# Patient Record
Sex: FEMALE | Race: WHITE
[De-identification: names, ages, dates, MRNs, and addresses within clinical notes are randomized per-mention and may not be internally consistent; named-entity substitution may affect disease eponyms.]

---

## 2021-02-20 ENCOUNTER — HOSPITAL ENCOUNTER (EMERGENCY)
Age: 46
Discharge: HOME | End: 2021-02-20
Payer: SELF-PAY

## 2021-02-20 VITALS
HEART RATE: 72 BPM | RESPIRATION RATE: 16 BRPM | TEMPERATURE: 97.88 F | SYSTOLIC BLOOD PRESSURE: 147 MMHG | DIASTOLIC BLOOD PRESSURE: 86 MMHG | OXYGEN SATURATION: 97 %

## 2021-02-20 VITALS — BODY MASS INDEX: 32.5 KG/M2
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
## 2021-02-20 DIAGNOSIS — M77.8: Primary | ICD-10-CM

## 2021-02-20 PROCEDURE — 99281 EMR DPT VST MAYX REQ PHY/QHP: CPT

## 2021-02-20 PROCEDURE — 73080 X-RAY EXAM OF ELBOW: CPT

## 2021-02-20 PROCEDURE — 99283 EMERGENCY DEPT VISIT LOW MDM: CPT

## 2021-02-20 NOTE — ED_ITS
"HPI - Extremity Injury (Upper)    
General    
Chief Complaint: Extremity Injury, Upper    
Stated Complaint: elbow pain    
Time Seen by Provider: 02/20/21 13:21    
Source: patient    
Mode of arrival: Ambulatory    
Limitations: no limitations    
History of Present Illness    
HPI narrative: Patient is a 45-year-old female who presents with right elbow   
pain.  She says she was at work a few weeks ago lifting boxes when she felt   
something pop.  Initially she did not have pain but since then she has had   
numbness and tingling along her median nerve in thumb index middle finger and   
part ring finger as well.  It hurts to flex extend pronate and supinate.  It has  
been off and on erythematous currently not erythematous she has not had any   
fever.  She has been taking Tylenol only she is unable to take NSAIDs because   
she is allergic    
MD complaint: injury to: right and elbow    
Relieving factors: none    
Related Data    
                                  Previous Rx's    
    
    
    
 Medication  Instructions  Recorded    
     
prednisone 20 mg PO DAILY #5 tab 02/20/21    
    
    
    
    
Review of Systems    
Review of Systems    
Narrative: GENERAL: Denies chills,fever    
HEENT: Denies throat pain    
RESPIRATORY: Denies dyspnea, cough, wheezing    
CARDIOVASCULAR: Denies chest pain, palpitations    
GASTROINTESTINAL: Denies nausea, vomiting    
MUSCULOSKELETAL:  See HPI    
SKIN: No rash, no laceration, no pruritus    
NEUROLOGIC: Denies weakness, dizziness, headache, numbness    
    
    
8 point review of systems is negative except for those stated above and HPI    
    
Patient History    
Medical History (Reviewed 02/20/21 @ 14:08 by Parul Davila DO)    
    
Fibromyalgia    
    
    
Social History (Reviewed 02/20/21 @ 14:08 by Parul Davila DO)    
Smoking Status:  Former smoker     
    
    
Smoking Status: Former smoker    
alcohol intake frequency: holidays/special occasions only    
Substance Use Type: does not use    
    
Exam    
Initial Vital Signs    
Initial Vital Signs: Vital Signs    
    
    
    
Temperature  97.9 F   02/20/21 13:07    
     
Pulse Rate  72   02/20/21 13:07    
     
Respiratory Rate  16   02/20/21 13:07    
     
Blood Pressure  147/86 H  02/20/21 13:07    
     
Pulse Oximetry  97   02/20/21 13:07    
    
    
GENERAL: Well-appearing, well-nourished and in no acute distress.    
CARDIOVASCULAR: peripheral pulses in tact, cap refill <2 sec    
RESPIRATORY: No respiratory distress,  speaks in full sentences without   
difficulty    
EXTREMITIES: Normal range of motion, no clubbing or edema.  Neurovascularly   
intact    
Right elbow:  Able to flex and extend but very slowly painful pronation   
supination.  Has significant pain medial side no bursitis no erythema    
NEUROLOGICAL: Cranial nerves II through XII grossly intact.  Normal gait and   
speech.    
SKIN: Warm, dry, no petechiae, no rashes or lesions.    
    
Course    
Orders    
Ordered:                            ED Orders    
    
02/20/21 13:13    
XR elbow RT min 3V Stat     
    
    
    
Vital Signs    
Vital signs:                   Vital Signs - 8 hr    
    
    
    
 02/20/21    
13:07    
     
Temperature 97.9 F    
     
Pulse Rate 72    
     
Respiratory Rate 16    
     
Blood Pressure 147/86 H    
     
Pulse Oximetry 97    
    
    
    
    
MDM - Extremity Injury (Upper)    
Imaging Data    
Extremity x-ray #1:     
      Radiologist's Impression:     
PROCEDURE:  XR ELBOW RT MIN 3V    
     
INDICATIONS:  elbow pain    
     
TECHNIQUE:  3 views of the elbow were acquired.     
     
COMPARISON:  None.    
     
FINDINGS:     
     
Bones:  No fractures or dislocations.  No suspicious bony lesions.     
     
Soft tissues:  No elbow joint effusion.  No suspicious soft tissue   
calcifications.     
     
     
IMPRESSION:  Elbow p
[id number]|2021-02-20 16:13:33|2021-02-20 16:13:33|ED.[email or web address]||||"HPI - Extremity Injury (Upper)

## 2021-02-20 NOTE — ED.UPPEXIN
"HPI - Extremity Injury (Upper)
General
Chief Complaint: Extremity Injury, Upper
Stated Complaint: elbow pain
Time Seen by Provider: 02/20/21 13:21
Source: patient
Mode of arrival: Ambulatory
Limitations: no limitations
History of Present Illness
HPI narrative: Patient is a 45-year-old female who presents with right elbow pain.  She says she was at work a few weeks ago lifting boxes when she felt something pop.  Initially she did not have pain but since then she has had numbness and tingling 
along her median nerve in thumb index middle finger and part ring finger as well.  It hurts to flex extend pronate and supinate.  It has been off and on erythematous currently not erythematous she has not had any fever.  She has been taking Tylenol 
only she is unable to take NSAIDs because she is allergic
MD complaint: injury to: right and elbow
Relieving factors: none
Related Data
Previous Rx's

 Medication  Instructions  Recorded
prednisone 20 mg PO DAILY #5 tab 02/20/21



Review of Systems
Review of Systems
Narrative: GENERAL: Denies chills,fever
HEENT: Denies throat pain
RESPIRATORY: Denies dyspnea, cough, wheezing
CARDIOVASCULAR: Denies chest pain, palpitations
GASTROINTESTINAL: Denies nausea, vomiting
MUSCULOSKELETAL:  See HPI
SKIN: No rash, no laceration, no pruritus
NEUROLOGIC: Denies weakness, dizziness, headache, numbness


8 point review of systems is negative except for those stated above and HPI

Patient History
Medical History (Reviewed 02/20/21 @ 14:08 by Parul Davila DO)

Fibromyalgia


Social History (Reviewed 02/20/21 @ 14:08 by Parul Davila DO)
Smoking Status:  Former smoker 


Smoking Status: Former smoker
alcohol intake frequency: holidays/special occasions only
Substance Use Type: does not use

Exam
Initial Vital Signs
Initial Vital Signs: Vital Signs

Temperature  97.9 F   02/20/21 13:07
Pulse Rate  72   02/20/21 13:07
Respiratory Rate  16   02/20/21 13:07
Blood Pressure  147/86 H  02/20/21 13:07
Pulse Oximetry  97   02/20/21 13:07

GENERAL: Well-appearing, well-nourished and in no acute distress.
CARDIOVASCULAR: peripheral pulses in tact, cap refill <2 sec
RESPIRATORY: No respiratory distress,  speaks in full sentences without difficulty
EXTREMITIES: Normal range of motion, no clubbing or edema.  Neurovascularly intact
Right elbow:  Able to flex and extend but very slowly painful pronation supination.  Has significant pain medial side no bursitis no erythema
NEUROLOGICAL: Cranial nerves II through XII grossly intact.  Normal gait and speech.
SKIN: Warm, dry, no petechiae, no rashes or lesions.

Course
Orders
Ordered:  ED Orders

02/20/21 13:13
XR elbow RT min 3V Stat 



Vital Signs
Vital signs:  Vital Signs - 8 hr

 02/20/21
13:07
Temperature 97.9 F
Pulse Rate 72
Respiratory Rate 16
Blood Pressure 147/86 H
Pulse Oximetry 97



MDM - Extremity Injury (Upper)
Imaging Data
Extremity x-ray #1: 
      Radiologist's Impression: 
PROCEDURE:  XR ELBOW RT MIN 3V
 
INDICATIONS:  elbow pain
 
TECHNIQUE:  3 views of the elbow were acquired. 
 
COMPARISON:  None.
 
FINDINGS: 
 
Bones:  No fractures or dislocations.  No suspicious bony lesions. 
 
Soft tissues:  No elbow joint effusion.  No suspicious soft tissue calcifications. 
 
 
IMPRESSION:  Elbow plain films within normal limits.
 
If it would be helpful for clinical management decision making, please consider a
dedicated elbow, scheduled MRI for further evaluation (assuming that there is no
contraindication).  
 
 
Dictated by: Arron Weems M.D. on 2/20/2021 at 12:42  

Discharge Plan
Departure
Patient Disposition: Home

Clinical Impression:
 Right elbow tendinitis


Instructions:  [facility]

Activity Restrictions/Additional Instructions:
*You have been diagnosed with elbow tendinitis
*What to do:  You likely have inflammation of the tendons.  You may require physical therapy and or MRI.  At this time recommend ice and a
[id number]|2021-02-20 13:13:00|2021-02-20 13:46:00|[email or web address]|[facility]|Imaging|[id number]|"PROCEDURE:  XR ELBOW RT MIN 3V Consent: Written consent obtained, risks reviewed including but not limited to crusting, scabbing, blistering, scarring, darker or lighter pigmentary change, bruising, and/or incomplete response. Fluence (J/Cm2): 25 Passes: 1 Cooling (In C): 15 Cooling ?: Yes Pulse Duration: 20 Pulse Duration Units: milliseconds Spot Size: Finesse Adapter Size: 15 x 45 mm (No Finesse Adapter) Post-Care Instructions: I reviewed with the patient in detail post-care instructions. Patient should stay away from the sun and wear sun protection until treated areas are fully healed. Hide Repetition Rate?: No Passes: 2 Fluence (J/Cm2): 8 Spot Size: Finesse Adapter Size: 15 x 15 mm square Additional Comments (Optional): 3rd pass on cheeks only Anesthesia Volume In Cc: 0 Preprocedure Text: The treatment areas were thoroughly cleaned. Any exposed at risk hair that was not to be treated was covered in white paper tape. Clear ultrasound gel was applied to the treatment area. The area was treated with no immediate stacking of pulses. Detail Level: Zone Spot Size: Finesse Adapter Size: 11 mm round Post Procedure Text: The patient tolerated the procedure well. Ice-chilled washclothes were applied to the treatment area for comfort. Post care was reviewed with the patient. Fluence (J/Cm2): 14 Repetition Rate (Hz): 13 Treated Area: small area

## 2023-01-12 ENCOUNTER — HOSPITAL ENCOUNTER (OUTPATIENT)
Dept: HOSPITAL 76 - LAB.N | Age: 48
Discharge: HOME | End: 2023-01-12
Attending: FAMILY MEDICINE
Payer: COMMERCIAL

## 2023-01-12 DIAGNOSIS — R00.1: Primary | ICD-10-CM

## 2023-01-12 DIAGNOSIS — Z83.2: ICD-10-CM

## 2023-01-12 DIAGNOSIS — Z13.220: ICD-10-CM

## 2023-01-12 DIAGNOSIS — M79.7: ICD-10-CM

## 2023-01-12 LAB
ALBUMIN DIAFP-MCNC: 4.3 G/DL (ref 3.2–5.5)
ALBUMIN/GLOB SERPL: 1.2 {RATIO} (ref 1–2.2)
ALP SERPL-CCNC: 88 IU/L (ref 42–121)
ALT SERPL W P-5'-P-CCNC: 25 IU/L (ref 10–60)
ANION GAP SERPL CALCULATED.4IONS-SCNC: 6 MMOL/L (ref 6–13)
AST SERPL W P-5'-P-CCNC: 18 IU/L (ref 10–42)
BASOPHILS NFR BLD AUTO: 0 10^3/UL (ref 0–0.1)
BASOPHILS NFR BLD AUTO: 0.7 %
BILIRUB BLD-MCNC: 0.9 MG/DL (ref 0.2–1)
BUN SERPL-MCNC: 12 MG/DL (ref 6–20)
CALCIUM UR-MCNC: 9.1 MG/DL (ref 8.5–10.3)
CHLORIDE SERPL-SCNC: 104 MMOL/L (ref 101–111)
CHOLEST SERPL-MCNC: 222 MG/DL
CO2 SERPL-SCNC: 26 MMOL/L (ref 21–32)
CREAT SERPLBLD-SCNC: 0.8 MG/DL (ref 0.4–1)
EOSINOPHIL # BLD AUTO: 0.1 10^3/UL (ref 0–0.7)
EOSINOPHIL NFR BLD AUTO: 1.8 %
ERYTHROCYTE [DISTWIDTH] IN BLOOD BY AUTOMATED COUNT: 14.2 % (ref 12–15)
GFRSERPLBLD MDRD-ARVRAT: 77 ML/MIN/{1.73_M2} (ref 89–?)
GLOBULIN SER-MCNC: 3.5 G/DL (ref 2.1–4.2)
GLUCOSE SERPL-MCNC: 97 MG/DL (ref 70–100)
HCT VFR BLD AUTO: 46.1 % (ref 37–47)
HDLC SERPL-MCNC: 56 MG/DL
HDLC SERPL: 4 {RATIO} (ref ?–4.4)
HGB UR QL STRIP: 14.5 G/DL (ref 12–16)
LDLC SERPL CALC-MCNC: 148 MG/DL
LDLC/HDLC SERPL: 2.6 {RATIO} (ref ?–4.4)
LYMPHOCYTES # SPEC AUTO: 1.6 10^3/UL (ref 1.5–3.5)
LYMPHOCYTES NFR BLD AUTO: 30 %
MCH RBC QN AUTO: 28.7 PG (ref 27–31)
MCHC RBC AUTO-ENTMCNC: 31.5 G/DL (ref 32–36)
MCV RBC AUTO: 91.3 FL (ref 81–99)
MONOCYTES # BLD AUTO: 0.4 10^3/UL (ref 0–1)
MONOCYTES NFR BLD AUTO: 7.7 %
NEUTROPHILS # BLD AUTO: 3.2 10^3/UL (ref 1.5–6.6)
NEUTROPHILS # SNV AUTO: 5.4 X10^3/UL (ref 4.8–10.8)
NEUTROPHILS NFR BLD AUTO: 59.6 %
NRBC # BLD AUTO: 0 /100WBC
NRBC # BLD AUTO: 0 X10^3/UL
PDW BLD AUTO: 11 FL (ref 7.9–10.8)
PLATELET # BLD: 292 10^3/UL (ref 130–450)
POTASSIUM SERPL-SCNC: 4.4 MMOL/L (ref 3.5–5)
PROT SPEC-MCNC: 7.8 G/DL (ref 6.7–8.2)
RBC MAR: 5.05 10^6/UL (ref 4.2–5.4)
SODIUM SERPLBLD-SCNC: 136 MMOL/L (ref 135–145)
TRIGL P FAST SERPL-MCNC: 88 MG/DL
TSH SERPL-ACNC: 0.71 UIU/ML (ref 0.34–5.6)
VLDLC SERPL-SCNC: 18 MG/DL

## 2023-01-12 PROCEDURE — 82627 DEHYDROEPIANDROSTERONE: CPT

## 2023-01-12 PROCEDURE — 85025 COMPLETE CBC W/AUTO DIFF WBC: CPT

## 2023-01-12 PROCEDURE — 80061 LIPID PANEL: CPT

## 2023-01-12 PROCEDURE — 84305 ASSAY OF SOMATOMEDIN: CPT

## 2023-01-12 PROCEDURE — 81599 UNLISTED MAAA: CPT

## 2023-01-12 PROCEDURE — 83721 ASSAY OF BLOOD LIPOPROTEIN: CPT

## 2023-01-12 PROCEDURE — 83001 ASSAY OF GONADOTROPIN (FSH): CPT

## 2023-01-12 PROCEDURE — 84403 ASSAY OF TOTAL TESTOSTERONE: CPT

## 2023-01-12 PROCEDURE — 84443 ASSAY THYROID STIM HORMONE: CPT

## 2023-01-12 PROCEDURE — 86038 ANTINUCLEAR ANTIBODIES: CPT

## 2023-01-12 PROCEDURE — 85651 RBC SED RATE NONAUTOMATED: CPT

## 2023-01-12 PROCEDURE — 84153 ASSAY OF PSA TOTAL: CPT

## 2023-01-12 PROCEDURE — 84402 ASSAY OF FREE TESTOSTERONE: CPT

## 2023-01-12 PROCEDURE — 80053 COMPREHEN METABOLIC PANEL: CPT

## 2023-01-12 PROCEDURE — 36415 COLL VENOUS BLD VENIPUNCTURE: CPT

## 2023-01-12 PROCEDURE — 83002 ASSAY OF GONADOTROPIN (LH): CPT

## 2023-01-12 PROCEDURE — 83003 ASSAY GROWTH HORMONE (HGH): CPT
